# Patient Record
Sex: FEMALE | Race: WHITE | URBAN - METROPOLITAN AREA
[De-identification: names, ages, dates, MRNs, and addresses within clinical notes are randomized per-mention and may not be internally consistent; named-entity substitution may affect disease eponyms.]

---

## 2019-02-06 ENCOUNTER — HOSPITAL ENCOUNTER (EMERGENCY)
Facility: CLINIC | Age: 29
Discharge: HOME | End: 2019-02-06
Attending: EMERGENCY MEDICINE | Admitting: EMERGENCY MEDICINE
Payer: COMMERCIAL

## 2019-02-06 ENCOUNTER — APPOINTMENT (OUTPATIENT)
Dept: GENERAL RADIOLOGY | Facility: CLINIC | Age: 29
End: 2019-02-06
Attending: EMERGENCY MEDICINE
Payer: COMMERCIAL

## 2019-02-06 VITALS
HEIGHT: 63 IN | SYSTOLIC BLOOD PRESSURE: 115 MMHG | BODY MASS INDEX: 27.29 KG/M2 | TEMPERATURE: 98.3 F | WEIGHT: 154 LBS | HEART RATE: 98 BPM | OXYGEN SATURATION: 99 % | RESPIRATION RATE: 18 BRPM | DIASTOLIC BLOOD PRESSURE: 89 MMHG

## 2019-02-06 DIAGNOSIS — S40.012A CONTUSION OF LEFT SHOULDER, INITIAL ENCOUNTER: ICD-10-CM

## 2019-02-06 DIAGNOSIS — S60.212A CONTUSION OF LEFT WRIST, INITIAL ENCOUNTER: ICD-10-CM
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
## 2019-02-06 PROCEDURE — 99284 EMERGENCY DEPT VISIT MOD MDM: CPT | Mod: Z6 | Performed by: EMERGENCY MEDICINE

## 2019-02-06 PROCEDURE — 99284 EMERGENCY DEPT VISIT MOD MDM: CPT | Performed by: EMERGENCY MEDICINE

## 2019-02-06 PROCEDURE — 73030 X-RAY EXAM OF SHOULDER: CPT | Mod: LT

## 2019-02-06 PROCEDURE — 25000132 ZZH RX MED GY IP 250 OP 250 PS 637: Performed by: EMERGENCY MEDICINE

## 2019-02-06 PROCEDURE — 73110 X-RAY EXAM OF WRIST: CPT | Mod: LT

## 2019-02-06 PROCEDURE — 25000131 ZZH RX MED GY IP 250 OP 636 PS 637: Performed by: EMERGENCY MEDICINE

## 2019-02-06 RX ORDER — CLINDAMYCIN HCL 300 MG
300 CAPSULE ORAL 3 TIMES DAILY
COMMUNITY
Start: 2019-02-03 | End: 2019-02-09

## 2019-02-06 RX ORDER — NAPROXEN SODIUM 220 MG
220 TABLET ORAL 2 TIMES DAILY WITH MEALS
COMMUNITY

## 2019-02-06 RX ORDER — SERTRALINE HYDROCHLORIDE 100 MG/1
150 TABLET, FILM COATED ORAL DAILY
COMMUNITY

## 2019-02-06 RX ORDER — MEDROXYPROGESTERONE ACETATE 150 MG/ML
150 INJECTION, SUSPENSION INTRAMUSCULAR
COMMUNITY

## 2019-02-06 RX ORDER — ONDANSETRON 8 MG/1
8 TABLET, ORALLY DISINTEGRATING ORAL ONCE
Status: COMPLETED | OUTPATIENT
Start: 2019-02-06 | End: 2019-02-06

## 2019-02-06 RX ORDER — OXYCODONE HYDROCHLORIDE 5 MG/1
5 TABLET ORAL ONCE
Status: COMPLETED | OUTPATIENT
Start: 2019-02-06 | End: 2019-02-06

## 2019-02-06 RX ADMIN — OXYCODONE HYDROCHLORIDE 5 MG: 5 TABLET ORAL at 10:10

## 2019-02-06 RX ADMIN — ONDANSETRON 8 MG: 8 TABLET, ORALLY DISINTEGRATING ORAL at 10:10

## 2019-02-06 ASSESSMENT — ENCOUNTER SYMPTOMS
VOMITING: 0
FEVER: 0
SHORTNESS OF BREATH: 0
COUGH: 0
ABDOMINAL PAIN: 0
DYSURIA: 0

## 2019-02-06 ASSESSMENT — MIFFLIN-ST. JEOR: SCORE: 1397.67

## 2019-02-06 NOTE — ED PROVIDER NOTES
History     Chief Complaint   Patient presents with     Wrist Pain     HPI  Pratima Naqvi is a 28 year old female who presents to the Emergency Department today for evaluation of left shoulder and left wrist pain following a fall last evening. The patient states that she was walking to her car last evening and slipped on the ice and fell onto her left side. The patient states that when she fell she braced her fall with her left arm extended out. She notes that she had a sharp pain in her left wrist following the fall, but she denies hearing any pops or cracks. She did not hit her head and denies loss of consciousness. Following the fall, the patient was able to get up on her own and she went inside and wrapped her wrist and applied ice. This morning, the patient states that she woke up with increased pain in her wrist and stiffness in her left shoulder. She also reports having paresthesias in her 4th and 5th digits of her left hand. She denies having any numbness or weakness.  Denies vision changes or headache.  The patient denies any presyncopal events leading up to her fall.  She denies recent fever, cough, denies any chest pain, shortness of breath, nausea, vomiting, abdominal pain.  The patient is right hand dominant.     I have reviewed the Medications, Allergies, Past Medical and Surgical History, and Social History in the [employer] system.    Past Medical History:   Diagnosis Date     Depressive disorder        Past Surgical History:   Procedure Laterality Date     CHOLECYSTECTOMY      2018       History reviewed. No pertinent family history.    Social History     Tobacco Use     Smoking status: Current Every Day Smoker     Packs/day: 0.50     Smokeless tobacco: Never Used   Substance Use Topics     Alcohol use: Yes     Comment: 2 drinks per week       No current facility-administered medications for this encounter.      Current Outpatient Medications   Medication     clindamycin (CLEOCIN) 300 MG capsule      "medroxyPROGESTERone (DEPO-PROVERA) 150 MG/ML IM injection     naproxen sodium (ANAPROX) 220 MG tablet     sertraline (ZOLOFT) 100 MG tablet        Allergies   Allergen Reactions     Doxycycline Hives     Metronidazole Hives     Latex Rash     Tramadol Itching and Rash      Review of Systems   Constitutional: Negative for fever.   Respiratory: Negative for cough and shortness of breath.    Cardiovascular: Negative for chest pain.   Gastrointestinal: Negative for abdominal pain and vomiting.   Genitourinary: Negative for dysuria.   Musculoskeletal:        Left wrist pain, left shoulder pain, 4th and 5th digit pain or left hand.    All other systems reviewed and are negative.    Physical Exam   BP: (!) 135/91  Pulse: 82  Temp: 98.3  F (36.8  C)  Resp: 14  Height: 160 cm (5' 3\")  Weight: 69.9 kg (154 lb)  SpO2: 99 %    Physical Exam  GEN:  Well developed, no acute distress  HEENT:  EOMI, Mucous membranes are moist.   Cardio:  RRR, no murmur, radial pulses equal bilaterally  PULM:  Lungs clear, good air movement, no wheezes, rales  Abd:  Soft, normal bowel sounds, no focal tenderness  Musculoskeletal: No C-spine, T-spine, L-spine tenderness, extremities have normal range of motion, although, there is increased pain in both the left shoulder and left wrist with range of motion testing.  There is no ecchymosis or focal tenderness of the left shoulder, no swelling.  Left wrist has tenderness over the distal ulna, no ecchymosis, swelling, abrasion.  No snuffbox tenderness.  No tenderness or swelling in the left hand. No lower extremity swelling or calf tenderness  Neuro:  Alert and oriented X3, Follows commands, normal strength and sensation in the upper extremities bilaterally.  Skin:  Warm, dry   ED Course   9:55 AM  The patient was seen and examined by Dr. Palacios in Room 19.       Procedures           Critical Care time:  none   Patient was given oral oxycodone for pain and oral dissolving Zofran in anticipation of nausea " from the pain medicine.  X-rays of the left shoulder and left wrist were reviewed by me and results are shown here:   Results for orders placed or performed during the hospital encounter of 02/06/19   XR Wrist Left G/E 3 Views    Narrative    Exam: XR WRIST LEFT G/E 3 VIEWS, 2/6/2019 11:55 AM    Indication: fall onto left wrist, pain    Comparison: None    Findings:   PA, oblique and lateral views of the left wrist. No bony lytic. No  visualized fractures. No definite soft tissue abnormality.      Impression    Impression: No acute osseous abnormality.    XR Shoulder Left G/E 3 Views    Narrative    Exam: XR SHOULDER LT G/E 3 VW, 2/6/2019 11:56 AM    Indication: trauma    Comparison: None    Findings:   AP, Grashey and Y views of the left shoulder. Normal bony alignment.  No visualized fractures. The visualized portions of the left lung are  clear.      Impression    Impression: No acute osseous abnormality.     Patient asked for a splint for the left wrist and was given 1 in the ED.    Labs Ordered and Resulted from Time of ED Arrival Up to the Time of Departure from the ED - No data to display         Assessments & Plan (with Medical Decision Making)   Patient presents after slipping on ice and falling.  She has left shoulder pain and left wrist pain.  There are no neurovascular deficits and x-rays are normal.  The pain is most likely from contusions.  She was advised to take ibuprofen and Tylenol as needed for the pain and she was given a splint for the left wrist for pain control.  She was advised to follow-up with her primary care physician if not improving in the next 1-2 weeks.  There is no sign of tendon injury at this time, however pain today makes the exam less reliable for tendon injury.  She was advised to follow-up with her primary care physician for reexamination if pain is not improving in 1-2 weeks.    I have reviewed the nursing notes.    I have reviewed the findings, diagnosis, plan and need for  follow up with the patient.       Medication List      There are no discharge medications for this visit.       Final diagnoses:   Fall on same level from slipping, tripping and stumbling with subsequent striking against other object, initial encounter   Contusion of left wrist, initial encounter   Contusion of left shoulder, initial encounter   I, Darius Caballero, am serving as a trained medical scribe to document services personally performed by Daphne Palacios MD, based on the provider's statements to me.   I, Daphne Palacios MD, was physically present and have reviewed and verified the accuracy of this note documented by Darius Caballero.     2/6/2019   [facility], [place], EMERGENCY DEPARTMENT     Jammie Palacios MD  02/06/19 [id number]

## 2019-02-06 NOTE — ED TRIAGE NOTES
Patient report she slipped on ice yesterday, landing with left hand extended. Having left wrist pain, unrelieved with tylenol.

## 2019-02-06 NOTE — ED AVS SNAPSHOT
[facility], [place], Emergency Department  [address] [place] [id number]  Phone:  [phone]                                    Pratima Naqvi   MRN: [id number]    Department:  [facility], Emergency Department   Date of Visit:  2/6/2019           After Visit Summary Signature Page    I have received my discharge instructions, and my questions have been answered. I have discussed any challenges I see with this plan with the nurse or doctor.    ..........................................................................................................................................  Patient/Patient Representative Signature      ..........................................................................................................................................  Patient Representative Print Name and Relationship to Patient    ..................................................               ................................................  Date                                   Time    ..........................................................................................................................................  Reviewed by Signature/Title    ...................................................              ..............................................  Date                                               Time          22EPIC Rev 08/18

## 2019-02-06 NOTE — DISCHARGE INSTRUCTIONS
Please make an appointment to follow up with Your Primary Care Provider in 7-14 days if not improving.

## 2021-06-04 ENCOUNTER — RECORDS - HEALTHEAST (OUTPATIENT)
Dept: ADMINISTRATIVE | Facility: CLINIC | Age: 31
End: 2021-06-04